# Patient Record
Sex: FEMALE | Race: WHITE | Employment: OTHER | URBAN - METROPOLITAN AREA
[De-identification: names, ages, dates, MRNs, and addresses within clinical notes are randomized per-mention and may not be internally consistent; named-entity substitution may affect disease eponyms.]

---

## 2017-02-02 ENCOUNTER — HOSPITAL ENCOUNTER (OUTPATIENT)
Dept: LAB | Age: 82
Discharge: HOME | End: 2017-02-02
Attending: INTERNAL MEDICINE
Payer: MEDICARE

## 2017-02-02 DIAGNOSIS — I10 ESSENTIAL HYPERTENSION: ICD-10-CM

## 2017-02-02 DIAGNOSIS — I25.10 CORONARY ARTERY DISEASE INVOLVING NATIVE CORONARY ARTERY OF NATIVE HEART WITHOUT ANGINA PECTORIS: ICD-10-CM

## 2017-02-02 DIAGNOSIS — E78.00 PURE HYPERCHOLESTEROLEMIA: ICD-10-CM

## 2017-02-02 LAB
CHOLEST SERPL-MCNC: 141 MG/DL
HDLC SERPL-MCNC: 49 MG/DL (ref 40–60)
HDLC SERPL: 2.9 {RATIO}
LDLC SERPL CALC-MCNC: 71 MG/DL
LIPID PROFILE,FLP: NORMAL
TRIGL SERPL-MCNC: 105 MG/DL (ref 35–150)
VLDLC SERPL CALC-MCNC: 21 MG/DL

## 2017-02-02 PROCEDURE — 80061 LIPID PANEL: CPT | Performed by: INTERNAL MEDICINE

## 2017-02-02 PROCEDURE — 36415 COLL VENOUS BLD VENIPUNCTURE: CPT | Performed by: INTERNAL MEDICINE

## 2017-08-15 PROBLEM — Z95.5 S/P CORONARY ARTERY STENT PLACEMENT: Chronic | Status: ACTIVE | Noted: 2017-08-15

## 2022-03-19 PROBLEM — Z95.5 S/P CORONARY ARTERY STENT PLACEMENT: Status: ACTIVE | Noted: 2017-08-15

## 2022-06-27 ENCOUNTER — TELEPHONE (OUTPATIENT)
Dept: CARDIOLOGY CLINIC | Age: 87
End: 2022-06-27

## 2022-06-27 NOTE — TELEPHONE ENCOUNTER
Called patient who voiced understanding of Dr. Nicky Peguero' response.----- Message from Leidy Galindo MD sent at 6/20/2022 10:18 AM EDT -----  Blood pressure log reviewed. She has labile blood pressure, more often normal to low with occasional spikes. I would continue current medications without change, stay well-hydrated on a daily basis, minimize sodium, alcohol, caffeine. Keep routine follow-up.

## 2022-11-13 ASSESSMENT — ENCOUNTER SYMPTOMS
SORE THROAT: 0
PHOTOPHOBIA: 0
CONSTIPATION: 0
ABDOMINAL DISTENTION: 0
COUGH: 0
DIARRHEA: 0
ABDOMINAL PAIN: 0
SHORTNESS OF BREATH: 0

## 2022-11-13 NOTE — PROGRESS NOTES
[facility] CARDIOLOGY  [address], [address]  [facility], [address] [facility] [address] [facility]  PHONE: [phone]        NAME:  Marino Martin  : 1935  MRN: [id number]     PCP:  Emeka Posada MD      SUBJECTIVE:   Marino Martin is a 81 y.o. female seen for a follow up visit regarding the following:     Chief Complaint   Patient presents with    Coronary Artery Disease    Hypertension       HPI:  Doing well since last visit without interval angina, CHF, palpitations, presyncope or syncope  She brings in several weeks of home BPs for my evaluation today. BP labile but ranging from 120s to 160s without any postural symptoms or falls. She has been compliant with meds and trying to minimize her sodium   LE dependent edema fairly well controlled on current meds, sodium avoidance, wearing compression socks and stable. Encouraged to drink a bit more fluids in early AM on days systolic<120. Added low-dose amlodipine with no worsening of dependent edema and slight improvement in blood pressures. Difficult situation given lower extremity edema as well but edema is stable and no other signs of volume overload. I am happy where she is at the present time and she will continue current meds, daily fluid and sodium monitoring, call me with systolics consistently over 140s or any worsening lower extremity edema. Past Medical History, Past Surgical History, Family history, Social History, and Medications were all reviewed with the patient today and updated as necessary.      Current Outpatient Medications   Medication Sig Dispense Refill    b complex vitamins capsule Take 1 capsule by mouth daily      aspirin 81 MG EC tablet Take by mouth daily      benzonatate (TESSALON) 100 MG capsule Take 100 mg by mouth 3 times daily as needed      Biotin 2.5 MG TABS Take by mouth      carvedilol (COREG) 12.5 MG tablet Take by mouth 2 times daily (with meals)      vitamin D3 (CHOLECALCIFEROL) 125 MCG (5000 UT) TABS tablet Take 1,000 Units by mouth daily      coenzyme Q10 100 MG CAPS capsule Take 100 mg by mouth daily      dextromethorphan-guaiFENesin (MUCINEX DM)  MG per extended release tablet Take 1 tablet by mouth 2 times daily      fluticasone (FLONASE) 50 MCG/ACT nasal spray 2 sprays by Nasal route daily      folic acid (FOLVITE) 947 MCG tablet Take 800 mcg by mouth daily      levothyroxine (SYNTHROID) 88 MCG tablet Take by mouth every morning (before breakfast)      loratadine (CLARITIN) 10 MG tablet Take 10 mg by mouth daily      montelukast (SINGULAIR) 10 MG tablet Take 10 mg by mouth daily      niacin 500 MG tablet Take 500 mg by mouth every morning (before breakfast)      nitroGLYCERIN (NITROSTAT) 0.4 MG SL tablet Place under the tongue      olmesartan (BENICAR) 40 MG tablet Take by mouth daily      oxybutynin (DITROPAN-XL) 10 MG extended release tablet Take 10 mg by mouth 2 times daily as needed      potassium chloride (KLOR-CON) 10 MEQ extended release tablet Take 10 mEq by mouth 2 times daily      rosuvastatin (CRESTOR) 40 MG tablet Take 40 mg by mouth      torsemide (DEMADEX) 20 MG tablet Take by mouth daily       No current facility-administered medications for this visit. Allergies   Allergen Reactions    Lisinopril Other (See Comments)    Codeine Other (See Comments)    Sulfa Antibiotics Other (See Comments)       Patient Active Problem List    Diagnosis Date Noted    S/P coronary artery stent placement 08/15/2017     Overview Note:     4.0 x 15 Zeta stent x 2 RCA        Hyperlipidemia 01/19/2016    CAD (coronary artery disease), native coronary artery 01/19/2016    Essential hypertension 01/19/2016        Past Surgical History:   Procedure Laterality Date    CARDIAC CATHETERIZATION         History reviewed. No pertinent family history.      Social History     Tobacco Use    Smoking status: Never    Smokeless tobacco: Never   Substance Use Topics    Alcohol use: Not on file       ROS:    Review of Systems Constitutional:  Negative for appetite change, chills, diaphoresis and fatigue. HENT:  Negative for congestion, mouth sores, nosebleeds, sore throat and tinnitus. Eyes:  Negative for photophobia and visual disturbance. Respiratory:  Negative for cough and shortness of breath. Cardiovascular:  Positive for leg swelling. Negative for chest pain and palpitations. Gastrointestinal:  Negative for abdominal distention, abdominal pain, constipation and diarrhea. Endocrine: Negative for cold intolerance, heat intolerance, polydipsia and polyuria. Genitourinary:  Negative for dysuria and hematuria. Musculoskeletal:  Negative for arthralgias, joint swelling and myalgias. Skin:  Negative for rash. Allergic/Immunologic: Negative for environmental allergies and food allergies. Neurological:  Negative for dizziness, seizures, syncope and light-headedness. Hematological:  Negative for adenopathy. Does not bruise/bleed easily. Psychiatric/Behavioral:  Negative for agitation, behavioral problems, dysphoric mood and hallucinations. The patient is not nervous/anxious. PHYSICAL EXAM:     Vitals:    11/14/22 1057   BP: 130/76   Pulse: 72   Weight: 177 lb 1.6 oz (80.3 kg)   Height: 5' 5\" (1.651 m)      Wt Readings from Last 3 Encounters:   11/14/22 177 lb 1.6 oz (80.3 kg)   05/10/22 181 lb 5 oz (82.2 kg)   11/16/21 183 lb 4.8 oz (83.1 kg)      BP Readings from Last 3 Encounters:   11/14/22 130/76   05/10/22 (!) 160/90   11/16/21 (!) 180/82        Physical Exam  Constitutional:       Appearance: Normal appearance. She is normal weight. HENT:      Head: Normocephalic and atraumatic. Nose: Nose normal.      Mouth/Throat:      Mouth: Mucous membranes are moist.      Pharynx: Oropharynx is clear. Eyes:      Extraocular Movements: Extraocular movements intact. Pupils: Pupils are equal, round, and reactive to light. Neck:      Vascular: No carotid bruit or JVD.    Cardiovascular:      Rate and Rhythm: Normal rate and regular rhythm. Heart sounds: Murmur (soft PRINCESS RUSB) heard. No friction rub. No gallop. Pulmonary:      Effort: Pulmonary effort is normal.      Breath sounds: Normal breath sounds. No wheezing or rhonchi. Abdominal:      General: Abdomen is flat. Bowel sounds are normal. There is no distension. Palpations: Abdomen is soft. Tenderness: There is no abdominal tenderness. Musculoskeletal:         General: No swelling. Normal range of motion. Cervical back: Normal range of motion and neck supple. No tenderness. Comments: Trace ankle edema bilaterally   Skin:     General: Skin is warm and dry. Neurological:      General: No focal deficit present. Mental Status: She is alert and oriented to person, place, and time. Mental status is at baseline. Psychiatric:         Mood and Affect: Mood normal.         Behavior: Behavior normal.        Medical problems and test results were reviewed with the patient today. No results found for: CHOL  No results found for: TRIG  No results found for: HDL  No results found for: LDLCHOLESTEROL, LDLCALC  No results found for: LABVLDL, VLDL  No results found for: CHOLHDLRATIO     No results found for: NA, K, CL, CO2, BUN, CREATININE, GLUCOSE, CALCIUM      No results for input(s): WBC, HGB, HCT, MCV, PLT in the last 720 hours. No results found for: LABA1C  No results found for: EAG     No results found for: BNP     No results found for: TSHFT4, TSH     No results found for any visits on 11/14/22. ASSESSMENT and PLAN    Diagnoses and all orders for this visit:      1. Coronary artery disease involving native coronary artery of native heart without angina pectoris- stable without angina, CHF, or palpitations. Continue meds/healthy diet. 2. Essential hypertension- better controlled-acceptable range of systolics given her age. Discussed lifestyle modification with plans for low carb/low sugar/low fat diet. Try to eat more lean protein, vegetables, and salads. Try  to get at least 20-30min moderate intensity cardiovascular exercise at least 4-5 times weekly as tolerated with goal of weight loss in the next year. 3.  LE edema- much improved overall on current regimen, continue. Elevate legs when resting, avoid sodium. Call with persistent worsening or AM edema       4. Mixed hyperlipidemia- excellent Nov 2019 in [address] [place] [address] Loop - tolerating meds well- per Dr. Janes Bliss long term- continue statin, fish oil, diet/walk/lose 10-15lbs this year if possible. 5. S/P coronary artery stent placement- doing well without angina, continue         Return in about 6 months (around 5/14/2023).          Archie Rutherford MD  11/14/2022  11:07 AM

## 2022-11-14 ENCOUNTER — OFFICE VISIT (OUTPATIENT)
Dept: CARDIOLOGY CLINIC | Age: 87
End: 2022-11-14
Payer: COMMERCIAL

## 2022-11-14 VITALS
SYSTOLIC BLOOD PRESSURE: 130 MMHG | BODY MASS INDEX: 29.51 KG/M2 | HEART RATE: 72 BPM | HEIGHT: 65 IN | WEIGHT: 177.1 LBS | DIASTOLIC BLOOD PRESSURE: 76 MMHG

## 2022-11-14 DIAGNOSIS — I10 ESSENTIAL HYPERTENSION: ICD-10-CM

## 2022-11-14 DIAGNOSIS — I25.10 CORONARY ARTERY DISEASE INVOLVING NATIVE CORONARY ARTERY OF NATIVE HEART WITHOUT ANGINA PECTORIS: ICD-10-CM

## 2022-11-14 DIAGNOSIS — Z95.5 S/P CORONARY ARTERY STENT PLACEMENT: Primary | ICD-10-CM

## 2022-11-14 DIAGNOSIS — E78.2 MIXED HYPERLIPIDEMIA: ICD-10-CM

## 2022-11-14 PROCEDURE — 99214 OFFICE O/P EST MOD 30 MIN: CPT | Performed by: INTERNAL MEDICINE

## 2022-11-14 PROCEDURE — 1123F ACP DISCUSS/DSCN MKR DOCD: CPT | Performed by: INTERNAL MEDICINE

## 2022-11-14 RX ORDER — VITAMIN B COMPLEX
1 CAPSULE ORAL DAILY
COMMUNITY

## 2023-03-16 RX ORDER — AMLODIPINE BESYLATE 2.5 MG/1
TABLET ORAL
Qty: 90 TABLET | Refills: 3 | OUTPATIENT
Start: 2023-03-16

## 2023-05-14 ASSESSMENT — ENCOUNTER SYMPTOMS
CONSTIPATION: 0
PHOTOPHOBIA: 0
SORE THROAT: 0
DIARRHEA: 0
ABDOMINAL DISTENTION: 0
SHORTNESS OF BREATH: 0
ABDOMINAL PAIN: 0
COUGH: 0

## 2023-05-16 ENCOUNTER — OFFICE VISIT (OUTPATIENT)
Age: 88
End: 2023-05-16
Payer: COMMERCIAL

## 2023-05-16 VITALS
HEART RATE: 60 BPM | HEIGHT: 65 IN | DIASTOLIC BLOOD PRESSURE: 78 MMHG | SYSTOLIC BLOOD PRESSURE: 139 MMHG | BODY MASS INDEX: 29.99 KG/M2 | WEIGHT: 180 LBS

## 2023-05-16 DIAGNOSIS — E78.2 MIXED HYPERLIPIDEMIA: ICD-10-CM

## 2023-05-16 DIAGNOSIS — I25.10 CORONARY ARTERY DISEASE INVOLVING NATIVE CORONARY ARTERY OF NATIVE HEART WITHOUT ANGINA PECTORIS: Primary | ICD-10-CM

## 2023-05-16 DIAGNOSIS — Z95.5 S/P CORONARY ARTERY STENT PLACEMENT: ICD-10-CM

## 2023-05-16 DIAGNOSIS — I10 ESSENTIAL HYPERTENSION: ICD-10-CM

## 2023-05-16 PROCEDURE — 1123F ACP DISCUSS/DSCN MKR DOCD: CPT | Performed by: INTERNAL MEDICINE

## 2023-05-16 PROCEDURE — 93000 ELECTROCARDIOGRAM COMPLETE: CPT | Performed by: INTERNAL MEDICINE

## 2023-05-16 PROCEDURE — 99214 OFFICE O/P EST MOD 30 MIN: CPT | Performed by: INTERNAL MEDICINE

## 2023-05-16 RX ORDER — AMLODIPINE BESYLATE 2.5 MG/1
2.5 TABLET ORAL DAILY
COMMUNITY
Start: 2022-05-10 | End: 2023-05-16 | Stop reason: SDUPTHER

## 2023-05-16 RX ORDER — AMLODIPINE BESYLATE 2.5 MG/1
2.5 TABLET ORAL DAILY
Qty: 90 TABLET | Refills: 3 | Status: SHIPPED | OUTPATIENT
Start: 2023-05-16

## 2023-08-26 ENCOUNTER — HOSPITAL ENCOUNTER (EMERGENCY)
Age: 88
Discharge: HOME | End: 2023-08-26
Attending: EMERGENCY MEDICINE
Payer: COMMERCIAL

## 2023-08-26 ENCOUNTER — APPOINTMENT (OUTPATIENT)
Dept: CT IMAGING | Age: 88
End: 2023-08-26
Payer: COMMERCIAL

## 2023-08-26 ENCOUNTER — APPOINTMENT (OUTPATIENT)
Dept: GENERAL RADIOLOGY | Age: 88
End: 2023-08-26
Payer: COMMERCIAL

## 2023-08-26 VITALS
TEMPERATURE: 100.6 F | RESPIRATION RATE: 22 BRPM | OXYGEN SATURATION: 95 % | SYSTOLIC BLOOD PRESSURE: 174 MMHG | DIASTOLIC BLOOD PRESSURE: 72 MMHG | WEIGHT: 175 LBS | BODY MASS INDEX: 29.12 KG/M2 | HEART RATE: 83 BPM

## 2023-08-26 DIAGNOSIS — J18.9 COMMUNITY ACQUIRED PNEUMONIA, UNSPECIFIED LATERALITY: Primary | ICD-10-CM

## 2023-08-26 LAB
ALBUMIN SERPL-MCNC: 3.7 G/DL (ref 3.2–4.6)
ALBUMIN/GLOB SERPL: 1.2 (ref 0.4–1.6)
ALP SERPL-CCNC: 75 U/L (ref 50–136)
ALT SERPL-CCNC: 21 U/L (ref 12–65)
ANION GAP SERPL CALC-SCNC: 7 MMOL/L (ref 2–11)
AST SERPL-CCNC: 17 U/L (ref 15–37)
B PERT DNA SPEC QL NAA+PROBE: NOT DETECTED
BASOPHILS # BLD: 0.1 K/UL (ref 0–0.2)
BASOPHILS NFR BLD: 1 % (ref 0–2)
BILIRUB SERPL-MCNC: 0.8 MG/DL (ref 0.2–1.1)
BORDETELLA PARAPERTUSSIS BY PCR: NOT DETECTED
BUN SERPL-MCNC: 11 MG/DL (ref 8–23)
C PNEUM DNA SPEC QL NAA+PROBE: NOT DETECTED
CALCIUM SERPL-MCNC: 8.8 MG/DL (ref 8.3–10.4)
CHLORIDE SERPL-SCNC: 112 MMOL/L (ref 101–110)
CO2 SERPL-SCNC: 24 MMOL/L (ref 21–32)
CREAT SERPL-MCNC: 0.9 MG/DL (ref 0.6–1)
DIFFERENTIAL METHOD BLD: ABNORMAL
EKG ATRIAL RATE: 83 BPM
EKG DIAGNOSIS: NORMAL
EKG P AXIS: -3 DEGREES
EKG P-R INTERVAL: 178 MS
EKG Q-T INTERVAL: 417 MS
EKG QRS DURATION: 99 MS
EKG QTC CALCULATION (BAZETT): 490 MS
EKG R AXIS: -21 DEGREES
EKG T AXIS: 83 DEGREES
EKG VENTRICULAR RATE: 83 BPM
EOSINOPHIL # BLD: 0.2 K/UL (ref 0–0.8)
EOSINOPHIL NFR BLD: 2 % (ref 0.5–7.8)
ERYTHROCYTE [DISTWIDTH] IN BLOOD BY AUTOMATED COUNT: 13.7 % (ref 11.9–14.6)
FLUAV SUBTYP SPEC NAA+PROBE: NOT DETECTED
FLUBV RNA SPEC QL NAA+PROBE: NOT DETECTED
GLOBULIN SER CALC-MCNC: 3 G/DL (ref 2.8–4.5)
GLUCOSE SERPL-MCNC: 128 MG/DL (ref 65–100)
HADV DNA SPEC QL NAA+PROBE: NOT DETECTED
HCOV 229E RNA SPEC QL NAA+PROBE: NOT DETECTED
HCOV HKU1 RNA SPEC QL NAA+PROBE: NOT DETECTED
HCOV NL63 RNA SPEC QL NAA+PROBE: NOT DETECTED
HCOV OC43 RNA SPEC QL NAA+PROBE: NOT DETECTED
HCT VFR BLD AUTO: 37.4 % (ref 35.8–46.3)
HGB BLD-MCNC: 13 G/DL (ref 11.7–15.4)
HMPV RNA SPEC QL NAA+PROBE: NOT DETECTED
HPIV1 RNA SPEC QL NAA+PROBE: NOT DETECTED
HPIV2 RNA SPEC QL NAA+PROBE: NOT DETECTED
HPIV3 RNA SPEC QL NAA+PROBE: NOT DETECTED
HPIV4 RNA SPEC QL NAA+PROBE: NOT DETECTED
IMM GRANULOCYTES # BLD AUTO: 0 K/UL (ref 0–0.5)
IMM GRANULOCYTES NFR BLD AUTO: 0 % (ref 0–5)
LACTATE SERPL-SCNC: 1 MMOL/L (ref 0.4–2)
LYMPHOCYTES # BLD: 1 K/UL (ref 0.5–4.6)
LYMPHOCYTES NFR BLD: 11 % (ref 13–44)
M PNEUMO DNA SPEC QL NAA+PROBE: NOT DETECTED
MCH RBC QN AUTO: 29.1 PG (ref 26.1–32.9)
MCHC RBC AUTO-ENTMCNC: 34.8 G/DL (ref 31.4–35)
MCV RBC AUTO: 83.7 FL (ref 82–102)
MONOCYTES # BLD: 1.3 K/UL (ref 0.1–1.3)
MONOCYTES NFR BLD: 14 % (ref 4–12)
NEUTS SEG # BLD: 6.8 K/UL (ref 1.7–8.2)
NEUTS SEG NFR BLD: 73 % (ref 43–78)
NRBC # BLD: 0 K/UL (ref 0–0.2)
PLATELET # BLD AUTO: 119 K/UL (ref 150–450)
PMV BLD AUTO: 11 FL (ref 9.4–12.3)
POTASSIUM SERPL-SCNC: 3.2 MMOL/L (ref 3.5–5.1)
PROCALCITONIN SERPL-MCNC: <0.05 NG/ML (ref 0–0.49)
PROT SERPL-MCNC: 6.7 G/DL (ref 6.3–8.2)
RBC # BLD AUTO: 4.47 M/UL (ref 4.05–5.2)
RSV RNA SPEC QL NAA+PROBE: NOT DETECTED
RV+EV RNA SPEC QL NAA+PROBE: DETECTED
SARS-COV-2 RDRP RESP QL NAA+PROBE: NOT DETECTED
SARS-COV-2 RNA RESP QL NAA+PROBE: NOT DETECTED
SODIUM SERPL-SCNC: 143 MMOL/L (ref 133–143)
SOURCE: NOT DETECTED
WBC # BLD AUTO: 9.3 K/UL (ref 4.3–11.1)

## 2023-08-26 PROCEDURE — 71045 X-RAY EXAM CHEST 1 VIEW: CPT

## 2023-08-26 PROCEDURE — 85025 COMPLETE CBC W/AUTO DIFF WBC: CPT

## 2023-08-26 PROCEDURE — 2580000003 HC RX 258: Performed by: EMERGENCY MEDICINE

## 2023-08-26 PROCEDURE — 96365 THER/PROPH/DIAG IV INF INIT: CPT

## 2023-08-26 PROCEDURE — 99285 EMERGENCY DEPT VISIT HI MDM: CPT

## 2023-08-26 PROCEDURE — 84145 PROCALCITONIN (PCT): CPT

## 2023-08-26 PROCEDURE — 6360000002 HC RX W HCPCS: Performed by: EMERGENCY MEDICINE

## 2023-08-26 PROCEDURE — 0202U NFCT DS 22 TRGT SARS-COV-2: CPT

## 2023-08-26 PROCEDURE — 80053 COMPREHEN METABOLIC PANEL: CPT

## 2023-08-26 PROCEDURE — 93010 ELECTROCARDIOGRAM REPORT: CPT | Performed by: INTERNAL MEDICINE

## 2023-08-26 PROCEDURE — 96367 TX/PROPH/DG ADDL SEQ IV INF: CPT

## 2023-08-26 PROCEDURE — 96375 TX/PRO/DX INJ NEW DRUG ADDON: CPT

## 2023-08-26 PROCEDURE — 6370000000 HC RX 637 (ALT 250 FOR IP): Performed by: EMERGENCY MEDICINE

## 2023-08-26 PROCEDURE — 87040 BLOOD CULTURE FOR BACTERIA: CPT

## 2023-08-26 PROCEDURE — 87635 SARS-COV-2 COVID-19 AMP PRB: CPT

## 2023-08-26 PROCEDURE — 71250 CT THORAX DX C-: CPT

## 2023-08-26 PROCEDURE — 93005 ELECTROCARDIOGRAM TRACING: CPT | Performed by: EMERGENCY MEDICINE

## 2023-08-26 PROCEDURE — 83605 ASSAY OF LACTIC ACID: CPT

## 2023-08-26 RX ORDER — AMOXICILLIN AND CLAVULANATE POTASSIUM 875; 125 MG/1; MG/1
1 TABLET, FILM COATED ORAL 2 TIMES DAILY
Qty: 14 TABLET | Refills: 0 | Status: SHIPPED | OUTPATIENT
Start: 2023-08-26 | End: 2023-09-02

## 2023-08-26 RX ORDER — ONDANSETRON 2 MG/ML
4 INJECTION INTRAMUSCULAR; INTRAVENOUS ONCE
Status: COMPLETED | OUTPATIENT
Start: 2023-08-26 | End: 2023-08-26

## 2023-08-26 RX ORDER — BENZONATATE 100 MG/1
200 CAPSULE ORAL 3 TIMES DAILY PRN
Qty: 30 CAPSULE | Refills: 0 | Status: SHIPPED | OUTPATIENT
Start: 2023-08-26 | End: 2023-08-31

## 2023-08-26 RX ORDER — ONDANSETRON 4 MG/1
4 TABLET, ORALLY DISINTEGRATING ORAL 3 TIMES DAILY PRN
Qty: 9 TABLET | Refills: 0 | Status: SHIPPED | OUTPATIENT
Start: 2023-08-26 | End: 2023-08-29

## 2023-08-26 RX ORDER — BENZONATATE 100 MG/1
200 CAPSULE ORAL
Status: COMPLETED | OUTPATIENT
Start: 2023-08-26 | End: 2023-08-26

## 2023-08-26 RX ADMIN — CEFTRIAXONE 1000 MG: 1 INJECTION, POWDER, FOR SOLUTION INTRAMUSCULAR; INTRAVENOUS at 14:15

## 2023-08-26 RX ADMIN — BENZONATATE 200 MG: 100 CAPSULE ORAL at 15:58

## 2023-08-26 RX ADMIN — ONDANSETRON 4 MG: 2 INJECTION INTRAMUSCULAR; INTRAVENOUS at 17:24

## 2023-08-26 RX ADMIN — AZITHROMYCIN MONOHYDRATE 500 MG: 500 INJECTION, POWDER, LYOPHILIZED, FOR SOLUTION INTRAVENOUS at 15:20

## 2023-08-26 ASSESSMENT — ENCOUNTER SYMPTOMS
BACK PAIN: 0
GASTROINTESTINAL NEGATIVE: 1
COUGH: 1
SHORTNESS OF BREATH: 1

## 2023-08-26 NOTE — ED NOTES
Bedside report received from [facility], [address] [place] [address]  08/26/23 2752

## 2023-08-26 NOTE — ED TRIAGE NOTES
Per EMS: patient is coming from home- went to the  On wedesday. Pt was prescribed cefdiir- unknown of the diagnose. Patient was coughing prior to doctor's visit. Coughing has not improved. Patient started vomiting yesterday. (3x-today). HX: HT, CAD, Hyperlipidemia.   VS: 180/80, Bgl, 126, HR 84, 96%RA

## 2023-08-26 NOTE — ED PROVIDER NOTES
Emergency Department Provider Note       PCP: Alba Plummer MD   Age: 81 y.o. Sex: female     [address]    1. Community acquired pneumonia, unspecified laterality  J18.9           Medical Decision Making     Complexity of Problems Addressed:  1 or more acute illnesses that pose a threat to life or bodily function. Data Reviewed and Analyzed:   I independently ordered and reviewed each unique test.  I reviewed external records: provider visit note from PCP. The patients assessment required an independent historian: EMS. The reason they were needed is important historical information not provided by the patient. I independently ordered and interpreted the ED EKG in the absence of a Cardiologist.    See below     I interpreted the X-rays chest x-ray with perihilar thickness bilaterally concerning for pneumonia. I interpreted the labs. Discussion of management or test interpretation. Story and history suspicious for worsening community-acquired pneumonia with failed outpatient treatment with cefdinir. Chest x-ray showing bilateral perihilar thickness. Will obtain blood culture, labs. Will give IV Rocephin and azithromycin.    3:21 PM EDT The patient's care will be transitioned to Dr. Harsha Monzon. At this time, the plan is pending labs and likely admission for community-acquired pneumonia with associated shortness of breath and failed outpatient treatment. Please see that provider's documentation for further information. Risk of Complications and/or Morbidity of Patient Management:      ED Course as of 08/26/23 1520   Sat Aug 26, 2023   1401 EKG obtained interpreted by me. Rate of 83. Borderline left axis deviation. Normal interval.  No STEMI criteria noted.  [DT]      ED Course User Index  [DT] Ronnie Temitope Saavedra MD         History      Carmen Orellana is a 81 y.o. female who presents to the Emergency Department with chief complaint of    Chief Complaint   Patient presents by mouth daily    ASPIRIN 81 MG EC TABLET    Take by mouth daily    B COMPLEX VITAMINS CAPSULE    Take 1 capsule by mouth daily    BENZONATATE (TESSALON) 100 MG CAPSULE    Take 1 capsule by mouth 3 times daily as needed    BIOTIN 2.5 MG TABS    Take by mouth    CARVEDILOL (COREG) 12.5 MG TABLET    Take by mouth 2 times daily (with meals)    COENZYME Q10 100 MG CAPS CAPSULE    Take 1 capsule by mouth daily    DEXTROMETHORPHAN-GUAIFENESIN (MUCINEX DM)  MG PER EXTENDED RELEASE TABLET    Take 1 tablet by mouth 2 times daily    FLUTICASONE (FLONASE) 50 MCG/ACT NASAL SPRAY    2 sprays by Nasal route daily    FOLIC ACID (FOLVITE) 625 MCG TABLET    Take 1 tablet by mouth daily    LEVOTHYROXINE (SYNTHROID) 88 MCG TABLET    Take by mouth every morning (before breakfast)    LORATADINE (CLARITIN) 10 MG TABLET    Take 1 tablet by mouth daily    MONTELUKAST (SINGULAIR) 10 MG TABLET    Take 1 tablet by mouth daily    NIACIN 500 MG TABLET    Take 1 tablet by mouth every morning (before breakfast)    NITROGLYCERIN (NITROSTAT) 0.4 MG SL TABLET    Place under the tongue    OLMESARTAN (BENICAR) 40 MG TABLET    Take by mouth daily    OXYBUTYNIN (DITROPAN-XL) 10 MG EXTENDED RELEASE TABLET    Take 1 tablet by mouth 2 times daily as needed    POTASSIUM CHLORIDE (KLOR-CON) 10 MEQ EXTENDED RELEASE TABLET    Take 1 tablet by mouth 2 times daily    ROSUVASTATIN (CRESTOR) 40 MG TABLET    Take 1 tablet by mouth    TORSEMIDE (DEMADEX) 20 MG TABLET    Take by mouth daily    VITAMIN D3 (CHOLECALCIFEROL) 125 MCG (5000 UT) TABS TABLET    Take 1,000 Units by mouth daily        Results for orders placed or performed during the hospital encounter of 08/26/23   Blood Culture 1    Specimen: Blood   Result Value Ref Range    Special Requests LEFT  Antecubital        Culture PENDING    XR CHEST PORTABLE    Narrative    Portable chest:     History: Sepsis    Comparison: None    Findings: A single view of the chest was obtained at 1333 hours.     The

## 2023-08-26 NOTE — DISCHARGE INSTRUCTIONS
As we discussed, we did not find the exact cause of your symptoms tonight in the emergency department. Therefore, it is important for you to get reevaluated if not feeling better over the next 48 hours. Please return sooner with any difficulty breathing, worsening symptoms, or additional concerns.

## 2023-08-26 NOTE — ED PROVIDER NOTES
Emergency Department Provider Signout / Continuation of Care Note         DISPOSITION         ICD-10-CM    1. Community acquired pneumonia, unspecified laterality  J18.9           The patient's care was signed out to me at shift change. Final Plan      Patient received from Dr. Nneka Garcia.  Patient has had a cough and is on Omnicef from her primary care doctor. She has been on that for couple of days but says she is not getting any better. She said that her cough is occasionally productive of some clear sputum. She came into the ER for further evaluation. Patient was checked out pending the results of most of her testing. Her chest x-ray shows possible vascular congestion versus chronic changes. Her lactic acid, white count, and procalcitonin are negative. On my exam she did have what sounded like some crackles in her right lung. Given limitations of her chest x-ray I will get a CT scan to look for occult fluid collection or infection. Of note the patient has an identical twin sister who was recently diagnosed with a similar illness in the patient's daughter says that the sister responded well to amoxicillin. I would think that anything bacterial that responded well to amoxicillin should have also responded well to cefdinir. Her CT scan shows a pulmonary nodule but no other significant pulmonary infiltrate or fluid collection. 6:29 PM  I discussed the CT findings including the nodule with the patient and the person that turns out to be her niece not her daughter. At this time I do not find thing urgent or emergent and I do not think she needs hospital admission. I will plan to discharge her home with a different antibiotic prescription and nausea medicine and cough medicine.      Caesar Lawrence MD  08/26/23 0127

## 2023-08-27 LAB
BACTERIA SPEC CULT: NORMAL
SERVICE CMNT-IMP: NORMAL

## 2023-08-27 NOTE — ED NOTES
Notified Malu Herrera MD of vitals and said was fine to discharge.      Kobi Astudillo RN  08/26/23 2021

## 2023-08-29 NOTE — PROGRESS NOTES
ED pharmacist has attempted to contact Zane Florez on 8/28 and 8/29 regarding their recent culture results via the phone number on file. See previous progress note. The patient has not returned calls. A letter has been sent to the address on file instructing patient to return call to ED. Shirlene Conner, PharmD.   Emergency Medicine Clinical Pharmacist

## 2023-08-31 LAB
BACTERIA SPEC CULT: NORMAL
SERVICE CMNT-IMP: NORMAL

## 2023-11-27 ASSESSMENT — ENCOUNTER SYMPTOMS
SORE THROAT: 0
COUGH: 0
PHOTOPHOBIA: 0
CONSTIPATION: 0
SHORTNESS OF BREATH: 0
ABDOMINAL DISTENTION: 0
DIARRHEA: 0
ABDOMINAL PAIN: 0

## 2023-11-27 NOTE — PROGRESS NOTES
[facility] CARDIOLOGY  [address] [facility] [address], [address]  PHONE: [phone]        NAME:  Lala Bustos  : 1935  MRN: [id number]     PCP:  Willy Voss MD      SUBJECTIVE:   Lala Bustos is a 81 y.o. female seen for a follow up visit regarding the following:     Chief Complaint   Patient presents with    6 Month Follow-Up    Coronary Artery Disease    Hypertension       HPI:  Doing well since last visit without interval angina, CHF, palpitations, presyncope or syncope  She brings in several weeks of home BPs for my evaluation today. BP labile but ranging from 105s to 160s without any postural symptoms or falls but persistently in the 140s over the past couple of weeks. She has been compliant with meds and trying to minimize her sodium   LE dependent edema fairly well controlled on current meds, sodium avoidance, wearing compression socks and stable/minimal today. Encouraged to drink a bit more fluids in early AM on days systolic<120. Added low-dose amlodipine with no worsening of dependent edema and slight improvement in blood pressures but still remains elevated as noted below. Difficult situation given lower extremity edema as well but currently edema is stable and no other signs of volume overload. I am happy with mild permissive hypertension at her age, and she will continue current meds, daily fluid and sodium monitoring, call me with systolics consistently over 140s or any worsening lower extremity edema. Past Medical History, Past Surgical History, Family history, Social History, and Medications were all reviewed with the patient today and updated as necessary.      Current Outpatient Medications   Medication Sig Dispense Refill    doxycycline hyclate (VIBRAMYCIN) 100 MG capsule Take 1 capsule by mouth 2 times daily 14 capsule 0    hyoscyamine (ANASPAZ;LEVSIN) 125 MCG tablet       predniSONE (DELTASONE) 20 MG tablet TAKE 2 TABLETS BY MOUTH EVERY DAY

## 2023-11-28 ENCOUNTER — OFFICE VISIT (OUTPATIENT)
Age: 88
End: 2023-11-28
Payer: COMMERCIAL

## 2023-11-28 VITALS
HEART RATE: 70 BPM | DIASTOLIC BLOOD PRESSURE: 88 MMHG | SYSTOLIC BLOOD PRESSURE: 154 MMHG | BODY MASS INDEX: 29.99 KG/M2 | WEIGHT: 180 LBS | HEIGHT: 65 IN

## 2023-11-28 DIAGNOSIS — E78.2 MIXED HYPERLIPIDEMIA: ICD-10-CM

## 2023-11-28 DIAGNOSIS — Z95.5 S/P CORONARY ARTERY STENT PLACEMENT: ICD-10-CM

## 2023-11-28 DIAGNOSIS — I25.10 CORONARY ARTERY DISEASE INVOLVING NATIVE CORONARY ARTERY OF NATIVE HEART WITHOUT ANGINA PECTORIS: Primary | ICD-10-CM

## 2023-11-28 DIAGNOSIS — I10 ESSENTIAL HYPERTENSION: ICD-10-CM

## 2023-11-28 PROCEDURE — 1123F ACP DISCUSS/DSCN MKR DOCD: CPT | Performed by: INTERNAL MEDICINE

## 2023-11-28 PROCEDURE — 99214 OFFICE O/P EST MOD 30 MIN: CPT | Performed by: INTERNAL MEDICINE

## 2023-11-28 RX ORDER — HYOSCYAMINE SULFATE 0.125 MG
TABLET ORAL
COMMUNITY
Start: 2023-09-12

## 2023-11-28 RX ORDER — PREDNISONE 20 MG/1
TABLET ORAL
COMMUNITY
Start: 2023-09-05

## 2023-11-28 RX ORDER — DOXYCYCLINE HYCLATE 100 MG/1
100 CAPSULE ORAL 2 TIMES DAILY
Qty: 14 CAPSULE | Refills: 0 | COMMUNITY
Start: 2023-11-27 | End: 2023-12-04

## 2023-12-28 ENCOUNTER — TELEPHONE (OUTPATIENT)
Age: 88
End: 2023-12-28

## 2023-12-28 RX ORDER — AMLODIPINE BESYLATE 5 MG/1
5 TABLET ORAL 2 TIMES DAILY
Qty: 90 TABLET | Refills: 3
Start: 2023-12-28 | End: 2023-12-29 | Stop reason: SDUPTHER

## 2023-12-28 NOTE — TELEPHONE ENCOUNTER
Pt states that she was put on a new RX Doxazosin on 12/19/2023 and was told by Dr. Be Cruz to take her BP everyday for a week, pt has the recordings.  Pt would appreciate a call back

## 2023-12-28 NOTE — TELEPHONE ENCOUNTER
I would continue current meds without change, with exception of changing amlodipine to 5 mg in the morning once daily dosing.

## 2023-12-28 NOTE — TELEPHONE ENCOUNTER
12/20/23 HR-62, BP-171/83  12/21/23 HR-70, 69, BP-134/72, 139/74  12/22/23 HR-67, BP-121/70  12/23/23 HR-59, BP-139/76  12/24/23 HR-70, BP-160/78  12/25/23 HR-61, BP-151/77  12/26/23 HR-69, BP-147/72  12/27/23 HR-64, BP-146/77  No headaches, vision changes, or other unusual symptoms. Began doxazosin 4 mg qd on 12/20/23. Continues Benicar 40 mg qd, carvedilol 12.5 mg BID, and amlodipine 2.5 mg BID. Patient asks if any med changes needed and asks for any other recommendations.

## 2023-12-28 NOTE — TELEPHONE ENCOUNTER
Left message on voicemail for patient to call this triage nurse.   Requested Prescriptions     Signed Prescriptions Disp Refills    amLODIPine (NORVASC) 5 MG tablet 90 tablet 3     Sig: Take 1 tablet by mouth in the morning and at bedtime     Authorizing Provider: Anna Arnold     Ordering User: Lonny Thao

## 2023-12-29 RX ORDER — AMLODIPINE BESYLATE 5 MG/1
5 TABLET ORAL 2 TIMES DAILY
Qty: 90 TABLET | Refills: 3 | Status: SHIPPED | OUTPATIENT
Start: 2023-12-29

## 2023-12-29 NOTE — TELEPHONE ENCOUNTER
Advised patient of Dr. Judge Esquivel' response. Advised patient to continue to monitor BP and HR and call with any further questions or concerns. Patient verbalized understanding.     Requested Prescriptions     Signed Prescriptions Disp Refills    amLODIPine (NORVASC) 5 MG tablet 90 tablet 3     Sig: Take 1 tablet by mouth in the morning and at bedtime     Authorizing Provider: Arsalan Luna     Ordering User: Becca SLAUGHTER     Amlodipine 5 mg qd, as above, E-prescribed to [address] [place] [address] Mail Delivery at patient's request.

## 2024-01-02 ENCOUNTER — TELEPHONE (OUTPATIENT)
Age: 89
End: 2024-01-02

## 2024-01-02 NOTE — TELEPHONE ENCOUNTER
Dr Young put her on new BP med Amlodipine and now taking 5mg in the AM and one at bedtime and she thought 2.5mg in the AM and he said said take 5mg in the morning . Please call

## 2024-01-02 NOTE — TELEPHONE ENCOUNTER
Pt received amlodipine from mail order pharmacy and confused about what dose she should be taking.  Triage informed pt Per med list pt to be taking amlodipine 5mg bid.     12/29/23 HR 63   /72  12/30      HR 65           136/74                      60 138/69  1/1/24          66            154/74

## 2024-05-20 RX ORDER — AMLODIPINE BESYLATE 5 MG/1
TABLET ORAL
Qty: 180 TABLET | Refills: 3 | Status: SHIPPED | OUTPATIENT
Start: 2024-05-20

## 2024-05-27 ASSESSMENT — ENCOUNTER SYMPTOMS
DIARRHEA: 0
ABDOMINAL PAIN: 0
COUGH: 0
SHORTNESS OF BREATH: 0
ABDOMINAL DISTENTION: 0
SORE THROAT: 0
CONSTIPATION: 0
PHOTOPHOBIA: 0

## 2024-05-27 NOTE — PROGRESS NOTES
[facility] CARDIOLOGY  [address] [place] [address], [address]  [place] [address]  PHONE: [phone]        NAME:  Le Issa  : 1935  MRN: [id number]     PCP:  Dennis Arias MD      SUBJECTIVE:   Le Issa is a 88 y.o. female seen for a follow up visit regarding the following:     Chief Complaint   Patient presents with    Hyperlipidemia    Coronary Artery Disease    Hypertension       HPI:  Doing well since last visit without interval angina, CHF, palpitations, presyncope or syncope but fell backwards while putting feet in a bird feeder and has a compression fracture that has been injected recently, no adverse cardiac events around the time of the procedure.  Stable since then from a cardiovascular standpoint and blood pressure by home monitoring ranging from 1 10-1 40 consistently.  She denies any postural symptoms whatsoever since being treated for her compression fracture.   She has been compliant with meds and trying to minimize her sodium   LE dependent edema fairly well controlled on current meds, sodium avoidance, wearing compression socks and stable/minimal today.  Encouraged to drink a bit more fluids in early AM on days systolic<120.  Added low-dose amlodipine with no worsening of dependent edema and slight improvement in blood pressures but still remains elevated as noted below.  Difficult situation given lower extremity edema as well but currently edema is stable and no other signs of volume overload.  I am happy with mild permissive hypertension at her age, and she will continue current meds, daily fluid and sodium monitoring, call me with systolics consistently over 140s or any worsening lower extremity edema.  Doxazosin was stopped at the time of her fall.  She is compliant with amlodipine and other antihypertensives with no postural symptoms whatsoever since recent hospitalization.     Past Medical History, Past Surgical History, Family history, Social History, and

## 2024-05-29 ENCOUNTER — OFFICE VISIT (OUTPATIENT)
Age: 89
End: 2024-05-29
Payer: COMMERCIAL

## 2024-05-29 VITALS
WEIGHT: 180 LBS | HEIGHT: 65 IN | SYSTOLIC BLOOD PRESSURE: 122 MMHG | BODY MASS INDEX: 29.99 KG/M2 | HEART RATE: 62 BPM | DIASTOLIC BLOOD PRESSURE: 70 MMHG

## 2024-05-29 DIAGNOSIS — I25.10 CORONARY ARTERY DISEASE INVOLVING NATIVE CORONARY ARTERY OF NATIVE HEART WITHOUT ANGINA PECTORIS: ICD-10-CM

## 2024-05-29 DIAGNOSIS — I10 ESSENTIAL HYPERTENSION: ICD-10-CM

## 2024-05-29 DIAGNOSIS — E78.2 MIXED HYPERLIPIDEMIA: ICD-10-CM

## 2024-05-29 DIAGNOSIS — Z95.5 S/P CORONARY ARTERY STENT PLACEMENT: Primary | ICD-10-CM

## 2024-05-29 PROCEDURE — 99214 OFFICE O/P EST MOD 30 MIN: CPT | Performed by: INTERNAL MEDICINE

## 2024-05-29 PROCEDURE — 1123F ACP DISCUSS/DSCN MKR DOCD: CPT | Performed by: INTERNAL MEDICINE

## 2024-05-29 ASSESSMENT — ENCOUNTER SYMPTOMS: BACK PAIN: 1

## 2024-06-17 ENCOUNTER — TELEPHONE (OUTPATIENT)
Age: 89
End: 2024-06-17

## 2024-06-17 NOTE — TELEPHONE ENCOUNTER
Patient called stating she has the following needs :    Has a VS Log for Dr Young  3 week  ? Best way to get these to Dr Young    Please call and advise.

## 2024-06-17 NOTE — TELEPHONE ENCOUNTER
Tried calling and unable to leave message, please inform pt log can be sent via mail or be dropped off at the office.

## 2024-06-26 ENCOUNTER — TELEPHONE (OUTPATIENT)
Age: 89
End: 2024-06-26

## 2024-06-26 NOTE — TELEPHONE ENCOUNTER
----- Message from Cuate Young MD sent at 6/26/2024  8:16 AM EDT -----  BP log reviewed.  This looks great for her age.  BP is high normal but this is perfectly acceptable given the fact that she is over 81.  I am happy with everything.  Continue meds, healthy diet and exercise.  Stay hydrated but minimize sodium.

## 2024-07-11 ENCOUNTER — TELEPHONE (OUTPATIENT)
Age: 89
End: 2024-07-11

## 2024-07-11 NOTE — TELEPHONE ENCOUNTER
Informed pt of Dr. Young' response. Pt voiced understanding and agreement with POC and thanks.  cgh

## 2024-07-11 NOTE — TELEPHONE ENCOUNTER
Dr Young wanted her to call back about her BP and pulse and BP is fine but pulse high between 89-96  Please call

## 2024-07-11 NOTE — TELEPHONE ENCOUNTER
Pt reports HR is higher than usual since 7/1/24.    HR        BP  92       127/79  91 120/74  96 122/74  89 126/75  84 119/60  87 110/68  66 121/71  87 145/84  92 127/77    No med changes, no cold meds, no prednisone  Hydrates pretty well.   Feels ok but this is a change for her.  cgh

## 2024-12-02 ASSESSMENT — ENCOUNTER SYMPTOMS
DIARRHEA: 0
COUGH: 0
ABDOMINAL DISTENTION: 0
SORE THROAT: 0
PHOTOPHOBIA: 0
ABDOMINAL PAIN: 0
BACK PAIN: 1
CONSTIPATION: 0
SHORTNESS OF BREATH: 0

## 2024-12-02 NOTE — PROGRESS NOTES
[facility] CARDIOLOGY  [address] [place] [address], [address]  [place] [address]  PHONE: [phone]        NAME:  Le Issa  : 1935  MRN: [id number]     PCP:  Dennis Arias MD      SUBJECTIVE:   Le Issa is a 89 y.o. female seen for a follow up visit regarding the following:     Chief Complaint   Patient presents with    Coronary Artery Disease    6 Month Follow-Up       HPI:  Doing well since last visit without interval angina, CHF, palpitations, presyncope or syncope but worsening lower extremity edema after Thanksgiving.  BP log stable with systolics consistently 110 to 140 mmHg.  She denies any postural symptoms whatsoever since being treated for her compression fracture.   She has been compliant with meds and trying to minimize her sodium.  LE dependent edema fairly well controlled on current meds, sodium avoidance, wearing compression socks and stable/minimal today.  Encouraged to drink a bit more fluids in early AM on days systolic<120.  Added amlodipine which was recently uptitrated to 5 mg twice daily with worsening edema after that.  I am happy with mild permissive hypertension at her age, and she will continue current meds, daily fluid and sodium monitoring, call me with systolics consistently over 140s or any worsening lower extremity edema.  Doxazosin was stopped at the time of her fall.  She is compliant with amlodipine and other antihypertensives with no postural symptoms whatsoever since recent hospitalization.     Past Medical History, Past Surgical History, Family history, Social History, and Medications were all reviewed with the patient today and updated as necessary.     Current Outpatient Medications   Medication Sig Dispense Refill    acetaminophen (TYLENOL) 500 MG tablet Take 2 tablets by mouth as needed for Pain      Omega 3-6-9 Fatty Acids (OMEGA 3-6-9 PO) Take by mouth      amLODIPine (NORVASC) 5 MG tablet TAKE 1 TABLET IN THE MORNING AND AT BEDTIME 180

## 2024-12-04 ENCOUNTER — OFFICE VISIT (OUTPATIENT)
Age: 89
End: 2024-12-04
Payer: MEDICARE

## 2024-12-04 VITALS
HEIGHT: 65 IN | WEIGHT: 189 LBS | DIASTOLIC BLOOD PRESSURE: 64 MMHG | HEART RATE: 89 BPM | BODY MASS INDEX: 31.49 KG/M2 | SYSTOLIC BLOOD PRESSURE: 110 MMHG

## 2024-12-04 DIAGNOSIS — I25.10 CORONARY ARTERY DISEASE INVOLVING NATIVE CORONARY ARTERY OF NATIVE HEART WITHOUT ANGINA PECTORIS: Primary | ICD-10-CM

## 2024-12-04 DIAGNOSIS — I10 ESSENTIAL HYPERTENSION: ICD-10-CM

## 2024-12-04 DIAGNOSIS — Z95.5 S/P CORONARY ARTERY STENT PLACEMENT: ICD-10-CM

## 2024-12-04 DIAGNOSIS — E78.2 MIXED HYPERLIPIDEMIA: ICD-10-CM

## 2024-12-04 PROCEDURE — 99214 OFFICE O/P EST MOD 30 MIN: CPT | Performed by: INTERNAL MEDICINE

## 2024-12-04 PROCEDURE — 1090F PRES/ABSN URINE INCON ASSESS: CPT | Performed by: INTERNAL MEDICINE

## 2024-12-04 PROCEDURE — G8484 FLU IMMUNIZE NO ADMIN: HCPCS | Performed by: INTERNAL MEDICINE

## 2024-12-04 PROCEDURE — 1126F AMNT PAIN NOTED NONE PRSNT: CPT | Performed by: INTERNAL MEDICINE

## 2024-12-04 PROCEDURE — 1160F RVW MEDS BY RX/DR IN RCRD: CPT | Performed by: INTERNAL MEDICINE

## 2024-12-04 PROCEDURE — 1159F MED LIST DOCD IN RCRD: CPT | Performed by: INTERNAL MEDICINE

## 2024-12-04 PROCEDURE — 1036F TOBACCO NON-USER: CPT | Performed by: INTERNAL MEDICINE

## 2024-12-04 PROCEDURE — 1123F ACP DISCUSS/DSCN MKR DOCD: CPT | Performed by: INTERNAL MEDICINE

## 2024-12-04 PROCEDURE — G8417 CALC BMI ABV UP PARAM F/U: HCPCS | Performed by: INTERNAL MEDICINE

## 2024-12-04 PROCEDURE — 93000 ELECTROCARDIOGRAM COMPLETE: CPT | Performed by: INTERNAL MEDICINE

## 2024-12-04 PROCEDURE — G8427 DOCREV CUR MEDS BY ELIG CLIN: HCPCS | Performed by: INTERNAL MEDICINE

## 2024-12-04 RX ORDER — ACETAMINOPHEN 500 MG
1000 TABLET ORAL AS NEEDED
COMMUNITY

## 2025-06-08 NOTE — PROGRESS NOTES
and PLAN    Diagnoses and all orders for this visit:      1. Coronary artery disease involving native coronary artery of native heart without angina pectoris- stable without angina, CHF, or palpitations.  Continue rosuvastatin and niacin dosing/healthy diet.       2. Essential hypertension- better controlled but actually low today, worsening pedal edema and intermittent postural dizziness now.  Decrease amlodipine to 5 mg nightly dosing and stay well-hydrated.  Elevate legs when resting, minimize sodium.  Call me with an update.      3.  LE edema-   worsening dependent lower extremity edema and dizzy now, BP low.  Decrease amlodipine to 5mg QHS dosing, call me with update in a few weeks.       4. Mixed hyperlipidemia- excellent Nov 2019 in Care Everywhere - tolerating meds well- per Dr. Arias long term- continue statin, omega-3's, diet/walk/lose 10-15lbs this year if possible.       5. S/P coronary artery stent placement- doing well without angina, continue aspirin low-dose as tolerated       Return in about 6 months (around 12/11/2025).         ALEX PATRICK MD  6/11/2025  11:36 AM

## 2025-06-11 ENCOUNTER — OFFICE VISIT (OUTPATIENT)
Age: 89
End: 2025-06-11
Payer: COMMERCIAL

## 2025-06-11 VITALS
SYSTOLIC BLOOD PRESSURE: 104 MMHG | WEIGHT: 188 LBS | DIASTOLIC BLOOD PRESSURE: 60 MMHG | HEIGHT: 65 IN | HEART RATE: 69 BPM | BODY MASS INDEX: 31.32 KG/M2

## 2025-06-11 DIAGNOSIS — I25.10 CORONARY ARTERY DISEASE INVOLVING NATIVE CORONARY ARTERY OF NATIVE HEART WITHOUT ANGINA PECTORIS: ICD-10-CM

## 2025-06-11 DIAGNOSIS — I10 ESSENTIAL HYPERTENSION: ICD-10-CM

## 2025-06-11 DIAGNOSIS — E78.2 MIXED HYPERLIPIDEMIA: ICD-10-CM

## 2025-06-11 DIAGNOSIS — Z95.5 S/P CORONARY ARTERY STENT PLACEMENT: Primary | ICD-10-CM

## 2025-06-11 PROCEDURE — 1090F PRES/ABSN URINE INCON ASSESS: CPT | Performed by: INTERNAL MEDICINE

## 2025-06-11 PROCEDURE — G8427 DOCREV CUR MEDS BY ELIG CLIN: HCPCS | Performed by: INTERNAL MEDICINE

## 2025-06-11 PROCEDURE — G8417 CALC BMI ABV UP PARAM F/U: HCPCS | Performed by: INTERNAL MEDICINE

## 2025-06-11 PROCEDURE — 93000 ELECTROCARDIOGRAM COMPLETE: CPT | Performed by: INTERNAL MEDICINE

## 2025-06-11 PROCEDURE — 99214 OFFICE O/P EST MOD 30 MIN: CPT | Performed by: INTERNAL MEDICINE

## 2025-06-11 PROCEDURE — 1036F TOBACCO NON-USER: CPT | Performed by: INTERNAL MEDICINE

## 2025-06-11 PROCEDURE — 1123F ACP DISCUSS/DSCN MKR DOCD: CPT | Performed by: INTERNAL MEDICINE

## 2025-06-11 RX ORDER — AMLODIPINE BESYLATE 5 MG/1
5 TABLET ORAL EVERY EVENING
Qty: 90 TABLET | Refills: 3
Start: 2025-06-11